# Patient Record
Sex: FEMALE
[De-identification: names, ages, dates, MRNs, and addresses within clinical notes are randomized per-mention and may not be internally consistent; named-entity substitution may affect disease eponyms.]

---

## 2018-11-13 ENCOUNTER — HOSPITAL ENCOUNTER (EMERGENCY)
Dept: HOSPITAL 14 - H.ER | Age: 48
Discharge: HOME | End: 2018-11-13
Payer: COMMERCIAL

## 2018-11-13 VITALS
TEMPERATURE: 97.8 F | DIASTOLIC BLOOD PRESSURE: 89 MMHG | HEART RATE: 67 BPM | RESPIRATION RATE: 18 BRPM | SYSTOLIC BLOOD PRESSURE: 139 MMHG

## 2018-11-13 VITALS — OXYGEN SATURATION: 99 %

## 2018-11-13 DIAGNOSIS — M62.838: Primary | ICD-10-CM

## 2018-11-13 PROCEDURE — 96372 THER/PROPH/DIAG INJ SC/IM: CPT

## 2018-11-13 PROCEDURE — 73030 X-RAY EXAM OF SHOULDER: CPT

## 2018-11-13 PROCEDURE — 72052 X-RAY EXAM NECK SPINE 6/>VWS: CPT

## 2018-11-13 PROCEDURE — 99283 EMERGENCY DEPT VISIT LOW MDM: CPT

## 2018-11-13 NOTE — RAD
Date of service: 



11/13/2018



PROCEDURE:  Cervical Spine Radiographs.



HISTORY:

Left shoulder and neck pain.  No history of trauma provided 



COMPARISON:

None available.



FINDINGS:



BONES:

Alignment maintained. No fracture.  Dens Intact. 



DISC SPACES:

Normal. 



SOFT TISSUES:

Normal. No prevertebral soft tissue swelling. 



OTHER FINDINGS:

None.



IMPRESSION:

Normal cervical spine radiographs

## 2018-11-13 NOTE — RAD
Date of service: 



11/13/2018



PROCEDURE:  Radiographs of the Left Shoulder



HISTORY:

left shoulder pain







COMPARISON:

No prior.



FINDINGS:



BONES:

Normal. No fracture.



JOINTS:

Normal. Glenohumeral and acromioclavicular joints preserved. No 

osteoarthritis.



SOFT TISSUES:

Normal.



OTHER FINDINGS:

Evidence of calcific tendinopathy. 



IMPRESSION:

No acute findings related to/accounting  for the clinical 

presentation.

## 2018-11-13 NOTE — ED PDOC
Upper Extremity Pain/Injury


Time Seen by Provider: 11/13/18 09:12


Chief Complaint (Nursing): Upper Extremity Problem/Injury


Chief Complaint (Provider): Upper Extremity Problem/Injury


History Per: Patient


History/Exam Limitations: no limitations


Onset/Duration Of Symptoms: Days (x1)


Additional Complaint(s): 





48 years old female with no significant pmhx presents to ER for evaluation of 

sudden onset of left sided neck pain radiating to left shoulder. Patient reports

pain woke her up at 3 am and states she never had this pain before. She states 

she was carrying her grandchild yesterday when going through a doorway she hit 

the door on left shoulder. Patient reports pain was mild then but worsened this 

morning. She is unable to abduct the left arm or turn head. Patient denies any 

fever, nausea or loss of consciousness.





PMD: non provided





Past Medical History


Reviewed: Historical Data, Nursing Documentation, Vital Signs


Vital Signs: 


                                Last Vital Signs











Temp  97.0 F L  11/13/18 09:01


 


Pulse  70   11/13/18 09:01


 


Resp  16   11/13/18 09:01


 


BP  152/82 H  11/13/18 09:01


 


Pulse Ox  99   11/13/18 09:01














- Medical History


PMH: No Chronic Diseases





- Surgical History


Surgical History: No Surg Hx





- Family History


Family History: States: Unknown Family Hx





- Home Medications


Home Medications: 


                                Ambulatory Orders











 Medication  Instructions  Recorded


 


Cyclobenzaprine [Cyclobenzaprine 10 mg PO TID #20 tab 02/08/16





HCl]  


 


Ibuprofen [Motrin] 600 mg PO Q6 #20 tab 02/08/16














- Allergies


Allergies/Adverse Reactions: 


                                    Allergies











Allergy/AdvReac Type Severity Reaction Status Date / Time


 


No Known Allergies Allergy   Unverified 08/25/14 19:50














Review of Systems


ROS Statement: Except As Marked, All Systems Reviewed And Found Negative


Constitutional: Negative for: Fever


Gastrointestinal: Negative for: Nausea


Musculoskeletal: Positive for: Neck Pain (left sided), Shoulder Pain (Left)





Physical Exam





- Reviewed


Nursing Documentation Reviewed: Yes


Vital Signs Reviewed: Yes





- Physical Exam


Appears: Positive for: Non-toxic, No Acute Distress


Head Exam: Positive for: ATRAUMATIC, NORMOCEPHALIC


Skin: Positive for: Normal Color, Warm, Dry


Neck: Negative for: Normal (Positive tenderness to palpation along the lateral 

left side of the neck)


Cardiovascular/Chest: Positive for: Regular Rate, Rhythm.  Negative for: Murmur


Respiratory: Positive for: Normal Breath Sounds.  Negative for: Wheezing


Extremity: Positive for: Tenderness (to palpation along the superior trapezius. 

Unable to abduct left arm), Other (anterior and posterior movement of left 

shoulder intact).  Negative for: Normal ROM (Passive movement of left shoulder 

limited to pain)


Neurologic/Psych: Positive for: Alert, Oriented (x3)





- ECG


O2 Sat by Pulse Oximetry: 99 (RA)


Pulse Ox Interpretation: Normal





Medical Decision Making


Medical Decision Making: 





Time: 0916





--Workup for service cervicalgia vs. bony injury


--Toradol and flexeril for pain


--Left shoulder and neck x-rays


--Reassess patient 





1157


Shoulder x-ray FINDINGS:





BONES:


Normal. No fracture.





JOINTS:


Normal. Glenohumeral and acromioclavicular joints preserved. No osteoarthritis.





SOFT TISSUES:


Normal.





OTHER FINDINGS:


Evidence of calcific tendinopathy. 





IMPRESSION:


No acute findings related to/accounting  for the clinical presentation.





1158


Spine x-ray FINDINGS:





BONES:


Alignment maintained. No fracture.  Dens Intact. 





DISC SPACES:


Normal. 





SOFT TISSUES:


Normal. No prevertebral soft tissue swelling. 





OTHER FINDINGS:


None.





IMPRESSION:


Normal cervical spine radiographs





1208


Images show no acute injury and patient reports improvement of pain on Toradol 

and Flexeril. Patient is medically stable for discharge with a prescription of 

Flexeril and Naproxen and instructed to follow up with PMD.





------------------------------------------------------

-------------------------------


Scribe Attestation:


Documented by Vane Burris, acting as a scribe for Nika Kelly MD.





Provider Scribe Attestation:


All medical record entries made by the Scribe were at my direction and 

personally dictated by me. I have reviewed the chart and agree that the record 

accurately reflects my personal performance of the history, physical exam, 

medical decision making, and the department course for this patient. I have also

 personally directed, reviewed, and agree with the discharge instructions and 

disposition.





Disposition





- Clinical Impression


Clinical Impression: 


 Muscle spasm








- Patient ED Disposition


Is Patient to be Admitted: No





- Disposition


Disposition: Routine/Home


Disposition Time: 12:08


Condition: STABLE


Forms:  [facility] [employer] [facility] (English)